# Patient Record
Sex: MALE | Race: WHITE | Employment: FULL TIME | URBAN - METROPOLITAN AREA
[De-identification: names, ages, dates, MRNs, and addresses within clinical notes are randomized per-mention and may not be internally consistent; named-entity substitution may affect disease eponyms.]

---

## 2024-08-21 ENCOUNTER — TELEPHONE (OUTPATIENT)
Dept: FAMILY MEDICINE | Facility: CLINIC | Age: 59
End: 2024-08-21

## 2024-08-21 ENCOUNTER — OFFICE VISIT (OUTPATIENT)
Dept: FAMILY MEDICINE | Facility: CLINIC | Age: 59
End: 2024-08-21
Payer: COMMERCIAL

## 2024-08-21 VITALS
RESPIRATION RATE: 18 BRPM | HEIGHT: 73 IN | WEIGHT: 269.9 LBS | TEMPERATURE: 98.6 F | OXYGEN SATURATION: 93 % | DIASTOLIC BLOOD PRESSURE: 84 MMHG | HEART RATE: 71 BPM | SYSTOLIC BLOOD PRESSURE: 136 MMHG | BODY MASS INDEX: 35.77 KG/M2

## 2024-08-21 DIAGNOSIS — R53.83 OTHER FATIGUE: ICD-10-CM

## 2024-08-21 DIAGNOSIS — I25.2 HISTORY OF NON-ST ELEVATION MYOCARDIAL INFARCTION (NSTEMI): ICD-10-CM

## 2024-08-21 DIAGNOSIS — E11.9 TYPE 2 DIABETES MELLITUS WITHOUT COMPLICATION, WITH LONG-TERM CURRENT USE OF INSULIN (H): Primary | ICD-10-CM

## 2024-08-21 DIAGNOSIS — Z78.9 MEDICALLY COMPLEX PATIENT: ICD-10-CM

## 2024-08-21 DIAGNOSIS — I10 PRIMARY HYPERTENSION: ICD-10-CM

## 2024-08-21 DIAGNOSIS — E66.01 CLASS 2 SEVERE OBESITY DUE TO EXCESS CALORIES WITH SERIOUS COMORBIDITY AND BODY MASS INDEX (BMI) OF 35.0 TO 35.9 IN ADULT (H): ICD-10-CM

## 2024-08-21 DIAGNOSIS — E66.812 CLASS 2 SEVERE OBESITY DUE TO EXCESS CALORIES WITH SERIOUS COMORBIDITY AND BODY MASS INDEX (BMI) OF 35.0 TO 35.9 IN ADULT (H): ICD-10-CM

## 2024-08-21 DIAGNOSIS — Z79.4 TYPE 2 DIABETES MELLITUS WITHOUT COMPLICATION, WITH LONG-TERM CURRENT USE OF INSULIN (H): Primary | ICD-10-CM

## 2024-08-21 DIAGNOSIS — E78.5 HYPERLIPIDEMIA LDL GOAL <70: ICD-10-CM

## 2024-08-21 LAB
ALBUMIN SERPL BCG-MCNC: 4.4 G/DL (ref 3.5–5.2)
ALP SERPL-CCNC: 71 U/L (ref 40–150)
ALT SERPL W P-5'-P-CCNC: 43 U/L (ref 0–70)
ANION GAP SERPL CALCULATED.3IONS-SCNC: 11 MMOL/L (ref 7–15)
AST SERPL W P-5'-P-CCNC: 31 U/L (ref 0–45)
BILIRUB SERPL-MCNC: 0.7 MG/DL
BUN SERPL-MCNC: 12.4 MG/DL (ref 6–20)
CALCIUM SERPL-MCNC: 9.5 MG/DL (ref 8.8–10.4)
CHLORIDE SERPL-SCNC: 105 MMOL/L (ref 98–107)
CHOLEST SERPL-MCNC: 161 MG/DL
CREAT SERPL-MCNC: 0.7 MG/DL (ref 0.67–1.17)
CREAT UR-MCNC: 108 MG/DL
EGFRCR SERPLBLD CKD-EPI 2021: >90 ML/MIN/1.73M2
ERYTHROCYTE [DISTWIDTH] IN BLOOD BY AUTOMATED COUNT: 12 % (ref 10–15)
FASTING STATUS PATIENT QL REPORTED: YES
FASTING STATUS PATIENT QL REPORTED: YES
GLUCOSE SERPL-MCNC: 108 MG/DL (ref 70–99)
HBA1C MFR BLD: 9 % (ref 0–5.6)
HCO3 SERPL-SCNC: 22 MMOL/L (ref 22–29)
HCT VFR BLD AUTO: 42.2 % (ref 40–53)
HDLC SERPL-MCNC: 54 MG/DL
HGB BLD-MCNC: 14.4 G/DL (ref 13.3–17.7)
LDLC SERPL CALC-MCNC: 84 MG/DL
MCH RBC QN AUTO: 29.1 PG (ref 26.5–33)
MCHC RBC AUTO-ENTMCNC: 34.1 G/DL (ref 31.5–36.5)
MCV RBC AUTO: 85 FL (ref 78–100)
MICROALBUMIN UR-MCNC: 51.9 MG/L
MICROALBUMIN/CREAT UR: 48.06 MG/G CR (ref 0–17)
NONHDLC SERPL-MCNC: 107 MG/DL
PLATELET # BLD AUTO: 164 10E3/UL (ref 150–450)
POTASSIUM SERPL-SCNC: 4.5 MMOL/L (ref 3.4–5.3)
PROT SERPL-MCNC: 7.2 G/DL (ref 6.4–8.3)
RBC # BLD AUTO: 4.94 10E6/UL (ref 4.4–5.9)
SODIUM SERPL-SCNC: 138 MMOL/L (ref 135–145)
TRIGL SERPL-MCNC: 117 MG/DL
TSH SERPL DL<=0.005 MIU/L-ACNC: 1.36 UIU/ML (ref 0.3–4.2)
VIT D+METAB SERPL-MCNC: 26 NG/ML (ref 20–50)
WBC # BLD AUTO: 4.4 10E3/UL (ref 4–11)

## 2024-08-21 PROCEDURE — 80061 LIPID PANEL: CPT | Performed by: INTERNAL MEDICINE

## 2024-08-21 PROCEDURE — 82306 VITAMIN D 25 HYDROXY: CPT | Performed by: INTERNAL MEDICINE

## 2024-08-21 PROCEDURE — 82043 UR ALBUMIN QUANTITATIVE: CPT | Performed by: INTERNAL MEDICINE

## 2024-08-21 PROCEDURE — 84443 ASSAY THYROID STIM HORMONE: CPT | Performed by: INTERNAL MEDICINE

## 2024-08-21 PROCEDURE — 83036 HEMOGLOBIN GLYCOSYLATED A1C: CPT | Performed by: INTERNAL MEDICINE

## 2024-08-21 PROCEDURE — 82570 ASSAY OF URINE CREATININE: CPT | Performed by: INTERNAL MEDICINE

## 2024-08-21 PROCEDURE — 99204 OFFICE O/P NEW MOD 45 MIN: CPT | Performed by: INTERNAL MEDICINE

## 2024-08-21 PROCEDURE — 36415 COLL VENOUS BLD VENIPUNCTURE: CPT | Performed by: INTERNAL MEDICINE

## 2024-08-21 PROCEDURE — 85027 COMPLETE CBC AUTOMATED: CPT | Performed by: INTERNAL MEDICINE

## 2024-08-21 PROCEDURE — 80053 COMPREHEN METABOLIC PANEL: CPT | Performed by: INTERNAL MEDICINE

## 2024-08-21 ASSESSMENT — PAIN SCALES - GENERAL: PAINLEVEL: NO PAIN (0)

## 2024-08-21 NOTE — TELEPHONE ENCOUNTER
----- Message from Vincent Tsang sent at 8/21/2024  1:07 PM CDT -----  Please advise patient test for diabetes HbA1c is elevated at 9.0, please schedule with diabetic educator as advised in the clinic as well with medical therapy management with Dr Farhana Cotton, goal for HbA1c is less than 7.  CBC shows normal blood counts; no anemia reassuring.    Rest of labs are pending ,    It has been a pleasure to participate in your care.  Please call our clinic if you have any questions or concerns.     Dr Tsang

## 2024-08-21 NOTE — RESULT ENCOUNTER NOTE
Please advise patient test for diabetes HbA1c is elevated at 9.0, please schedule with diabetic educator as advised in the clinic as well with medical therapy management with Dr Farhana Cotton, goal for HbA1c is less than 7.  CBC shows normal blood counts; no anemia reassuring.    Rest of labs are pending ,    It has been a pleasure to participate in your care.  Please call our clinic if you have any questions or concerns.     Dr Tsang

## 2024-08-22 ENCOUNTER — TELEPHONE (OUTPATIENT)
Dept: FAMILY MEDICINE | Facility: CLINIC | Age: 59
End: 2024-08-22
Payer: COMMERCIAL

## 2024-08-22 PROBLEM — E66.01 CLASS 2 SEVERE OBESITY DUE TO EXCESS CALORIES WITH SERIOUS COMORBIDITY IN ADULT (H): Status: ACTIVE | Noted: 2024-08-22

## 2024-08-22 PROBLEM — E11.9 DIABETES MELLITUS, TYPE 2 (H): Status: ACTIVE | Noted: 2024-08-22

## 2024-08-22 PROBLEM — E66.812 CLASS 2 SEVERE OBESITY DUE TO EXCESS CALORIES WITH SERIOUS COMORBIDITY IN ADULT (H): Status: ACTIVE | Noted: 2024-08-22

## 2024-08-22 NOTE — RESULT ENCOUNTER NOTE
Kameron Hutchinson, reviewed rest of your labs,    Urine protein slightly positive at 48 suggests slight spilling of protein in the urine, adequate control of diabetes and blood pressure help prevent progression of the urine protein.  This is a marker of kidney injury.    Chemistry shows normal electrolytes, reassuring the kidney function by labs is normal with GFR is greater than 90; this is good news,  Electrolytes are normal,  Calcium level is normal,  Liver enzymes AST ALT and alk phosphatase are normal,  Total protein and albumin is normal.    Cholesterol panel shows numbers are at goal, although LDL preferably should be less than 70, your LDL [bad cholesterol] is 84.  HDL the good cholesterol is at goal at 54 and this is atheroprotective to the heart.  Triglyceride is normal.  Congratulations with the lipid panel results..  Please continue on statin medication you are currently taking; if not please you need to be on atorvastatin 40 mg 1 tablet once daily at bedtime.  Please let us know and we can discuss also with your upcoming visit.    Thyroid test called TSH is normal, does not explain your fatigue.    Vitamin D level is on the low side at 26.  Please take vitamin D3; 1000 units once daily over-the-counter.    Test for diabetes HbA1c is elevated.  Please schedule as discussed follow-up with diabetic educator.    Any further questions please let me know,  Regards,  Dr Tsang

## 2024-08-22 NOTE — TELEPHONE ENCOUNTER
Vincent Tsang MD [facility]  Hi Jasper, reviewed rest of your labs,    Urine protein slightly positive at 48 suggests slight spilling of protein in the urine, adequate control of diabetes and blood pressure help prevent progression of the urine protein.  This is a marker of kidney injury.    Chemistry shows normal electrolytes, reassuring the kidney function by labs is normal with GFR is greater than 90; this is good news,  Electrolytes are normal,  Calcium level is normal,  Liver enzymes AST ALT and alk phosphatase are normal,  Total protein and albumin is normal.    Cholesterol panel shows numbers are at goal, although LDL preferably should be less than 70, your LDL [bad cholesterol] is 84.  HDL the good cholesterol is at goal at 54 and this is atheroprotective to the heart.  Triglyceride is normal.  Congratulations with the lipid panel results..  Please continue on statin medication you are currently taking; if not please you need to be on atorvastatin 40 mg 1 tablet once daily at bedtime.  Please let us know and we can discuss also with your upcoming visit.    Thyroid test called TSH is normal, does not explain your fatigue.    Vitamin D level is on the low side at 26.  Please take vitamin D3; 1000 units once daily over-the-counter.    Test for diabetes HbA1c is elevated.  Please schedule as discussed follow-up with diabetic educator.    Any further questions please let me know,  Regards,  Dr Tsang

## 2024-09-20 ENCOUNTER — VIRTUAL VISIT (OUTPATIENT)
Dept: EDUCATION SERVICES | Facility: CLINIC | Age: 59
End: 2024-09-20
Attending: INTERNAL MEDICINE
Payer: COMMERCIAL

## 2024-09-20 DIAGNOSIS — Z79.4 TYPE 2 DIABETES MELLITUS WITHOUT COMPLICATION, WITH LONG-TERM CURRENT USE OF INSULIN (H): ICD-10-CM

## 2024-09-20 DIAGNOSIS — E11.9 TYPE 2 DIABETES MELLITUS WITHOUT COMPLICATION, WITH LONG-TERM CURRENT USE OF INSULIN (H): ICD-10-CM

## 2024-09-20 PROCEDURE — G0108 DIAB MANAGE TRN  PER INDIV: HCPCS | Mod: 95 | Performed by: DIETITIAN, REGISTERED

## 2024-09-20 NOTE — PROGRESS NOTES
"Virtual Visit Details    Type of service:  Video Visit     Originating Location (pt. Location): Home    Distant Location (provider location):  Off-site  Platform used for Video Visit: Instaclustr    DIABETES SELF-MANAGEMENT EDUCATION & SUPPORT    Presents for: Individual review for Type 2 Diabetes    Referred by: Dr. Tsang, whom he recently established care with.  Was being seen at [facility]      ASSESSMENT:  Jasper was seen virtually today.  He is establishing care.  He was seen at [facility] in the past and has had diabetes education in the past.    A1c above goal.  No blood sugar log book today.  The sugar ranges he reports are lower than I would expect with an A1c of 9(.  He reports it is too difficult to make changes in his diet or do more exercise.    No diabetes medications on medication list.-  Jasper reports taking 180 units of Toujeo  In the most recent provider note it was documented he was taking 80 units.  He reports taking Novolog, 25 - 35 units before he eats and Metformin, 2000 mg/day.  Both of these were documented in most recent provider note .   There was [facility] of [facility], but Jasper was not aware of this and I contacted his pharmacy and they do not have a prescription on file.    Recommend Jasper bring his diabetes medications and blood sugar log book to upcoming visit with MTM.  He would benefit from a glucose sensor and GLP-1 if he is open to it.  Will route this recommendation to his provider and MTM.    Benefits investigation for GLP:  Results:  Last Name Initial: Agustin MRN: [id number] Diagnosis Code: [\"T2D (E11)\"] [E11.9] Drug Class(es) and/or Medication(s) to Investigate: [\"GLP-1\"]  GLP-1: Trulicity: $191.95 Mounjaro: $209.95 Ozempic: $190.21 Bydureon: $162.52    English is a second language.  Recommend future appointments be in person to review blood sugars and diabetes medications.    Patient's most recent   Lab Results "   Component Value Date    A1C 9.0 08/21/2024     is not meeting goal of <7.0    Diabetes knowledge and skills assessment:   Patient is knowledgeable in diabetes management concepts related to: Being Active and Monitoring    Continue education with the following diabetes management concepts: Taking Medication and Problem Solving    Based on learning assessment above, most appropriate setting for further diabetes education would be: Individual setting.      PLAN    Will connect with DIANA Alvares to see if Jasper can be seen sooner to initiate a GLP1    INTERVENTION    Taking Medication: GLP-1/GIP Instruction - Ozempic administration technique taught today. Patient verbalized understanding and was able to perform an accurate return demonstration of administration technique. Side effects were discussed, if patient has any abdominal pain, with or without nausea and/or vomiting, stop medication, call provider, clinic or go to the emergency room.      SUBJECTIVE/OBJECTIVE:  Presents for: Individual review  Accompanied by: Self  Focus of Visit: Patient Unsure  Diabetes type: Type 2  Date of diagnosis: 6-7 years ago  Diabetes management related comments/concerns: I feel like I don't need this.  I changed from a different doctor.  Other concerns:: Language barrier  Cultural Influences/Ethnic Background:  Choose not to answer      Works in heating and cooling.  4 kids, 35, 32, 20 - twins.    Diabetes Symptoms & Complications:     Patient Problem List and Family Medical History reviewed for relevant medical history, current medical status, and diabetes risk factors.    Labs:  Lab Results   Component Value Date    A1C 9.0 08/21/2024     Lab Results   Component Value Date     08/21/2024       Healthy Eating:  Meal planning/habits:  (None - feels it is too hard to change his eating habits and eat less.)  Breakfast: 6 am:  Borscht soup (beef/pork, beets, potatoes, tomatoes) + 2 slices bread.  Lunch: 4 pm:  About 2  cupst Rice cooked with onion, carrots.  Dinner: 7 pm:  Fried potatoes + cucumber salad + grilled beef.  OR  Borscht soup. OR  Oats cooked with milk.  Snacks: Banana or apple.  Beverages: Water, Tea (tea on occasion)    Being Active:  Being Active Assessed Today: Yes (When he was with [employer])  Exercise:: Yes (Job is active.)  Days per week of moderate to strenuous exercise (like a brisk walk): 2  On average, minutes per day of exercise at this level: 60 (Walks 2 miles)  Exercise Minutes per Week: 120    Monitoring:  Monitoring Assessed Today: Yes  Times checking blood sugar at home (number): 4 (Reports mornings:  95 mg/dl. Evenings 190 - 200 mg/dl.)  Times checking blood sugar at home (per): Day    Taking Medications:      Current Treatments: Insulin Injections, Oral Medication (taken by mouth) (Metformin, 2 pills in the morning and 2 in the evening.  Long acting insulin (thinks it's Toujeo)- 180 units; Novolog - 25 units before meals or 35 units before high sugar meals.)  Given by: Patient    Problem Solving:  Is the patient at risk for hypoglycemia?: Yes  Hypoglycemia Frequency: Rarely (3-4 total.)    Hypoglycemia Symptoms  Hypoglycemia: Sweats, Feeling shaky           Lalita Wilkinson RDN, ANDREY, [facility]  Dietitian and Diabetes  - Endocrinology  [facility]      Time Spent: 30 minutes  Encounter Type: Individual    Any diabetes medication dose changes were made via the CDE Protocol per the patient's primary care provider. A copy of this encounter was shared with the provider.

## 2024-09-20 NOTE — PATIENT INSTRUCTIONS
Kameron Hutchinson,    It was a pleasure meeting with you today!    Here is a summary of our visit:    *Your pharmacy does not have a prescription for Ozempic.  Will follow up with your doctor to see if he recommends you start this medication.      *Set Up [employer] - have your daughter help you.    Follow Up Plan:    **Bring blood glucose record book and all medications to your meeting with the Pharmacist Farhana Cotton on Oct 22.    Thank you,    Lalita Wilkinson RDN, LDN, [facility]  Dietitian and Diabetes Education - Endocrinology  [facility] [place] [address]  Phone: [phone]  Email: [email or web address]    Contact information:   To schedule a diabetes education appointment:[phone].  For questions or concerns, please send a [email or web address] message or call the clinic at [phone].    For more urgent concerns that do not require 601, please call [phone] after hours/weekends and ask to speak with the Endocrinologist on call.       Please let us know if you are having low blood sugars less than 70 or over 300 mg/dL.  Do not wait until your next appointment if this is happening.

## 2024-09-20 NOTE — NURSING NOTE
Is the patient currently in the state of [place]? YES    Visit mode:VIDEO    If the visit is dropped, the patient can be reconnected by: VIDEO VISIT: Text to cell phone:   Telephone Information:   Mobile [phone]       Will anyone else be joining the visit? NO  (If patient encounters technical issues they should call [phone]    How would you like to obtain your AVS? MyChart    Are changes needed to the allergy or medication list? No    Are refills needed on medications prescribed by this physician? NO    Reason for visit: Follow Up    Johanna EPSTEIN

## 2024-09-20 NOTE — Clinical Note
Hi Dr. Tsang and Farhana -  I met with Jasper miranda for diabetes education today and recommend he be seen in person to review his diabetes medications, blood sugars, and possibly start a GLP1.  He is scheduled to see Farhana no 10.22 in person, but I'm wondering if he can see you sooner?  He's not open to lifestyle change, so I think a GLP1 would be good for him if Dr. Tsang approves?  His A1c is 9.  I'm happy to refer him to in-person diabetes ed if you prefer not to see him for his diab meds Farhana.    PS - see note for benefits investigation for GLP options.  Let me know what you think, Lalita Wilkinson RDN, ANDREY, [facility] Dietitian and Diabetes  - Endocrinology [facility] [place]

## 2024-09-24 ENCOUNTER — TELEPHONE (OUTPATIENT)
Dept: EDUCATION SERVICES | Facility: CLINIC | Age: 59
End: 2024-09-24
Payer: COMMERCIAL

## 2024-11-14 ENCOUNTER — TELEPHONE (OUTPATIENT)
Dept: FAMILY MEDICINE | Facility: CLINIC | Age: 59
End: 2024-11-14
Payer: COMMERCIAL

## 2024-11-14 NOTE — TELEPHONE ENCOUNTER
Letter already sent, thanks!  Farhana Cotton, PharmD, [facility]  Medication Therapy Management Provider  [phone]

## 2024-11-14 NOTE — TELEPHONE ENCOUNTER
MTM appointment no showed on 2 occasions.    [facility] pathfinder, priority Nicole-No show 10/22/24 and no show 11/12/24    Routing back to referring provider and MTM Pharmacist Team    MTM Practitioner please send patient letter    Sushma Devries [facility]  -[facility]  [phone]